# Patient Record
Sex: MALE | Employment: FULL TIME | URBAN - METROPOLITAN AREA
[De-identification: names, ages, dates, MRNs, and addresses within clinical notes are randomized per-mention and may not be internally consistent; named-entity substitution may affect disease eponyms.]

---

## 2024-09-12 ENCOUNTER — APPOINTMENT (OUTPATIENT)
Dept: PRIMARY CARE | Facility: CLINIC | Age: 29
End: 2024-09-12
Payer: COMMERCIAL

## 2024-09-26 ENCOUNTER — APPOINTMENT (OUTPATIENT)
Dept: PRIMARY CARE | Facility: CLINIC | Age: 29
End: 2024-09-26
Payer: COMMERCIAL

## 2024-09-26 VITALS
SYSTOLIC BLOOD PRESSURE: 128 MMHG | BODY MASS INDEX: 32.07 KG/M2 | OXYGEN SATURATION: 96 % | HEART RATE: 84 BPM | WEIGHT: 224 LBS | DIASTOLIC BLOOD PRESSURE: 72 MMHG | HEIGHT: 70 IN

## 2024-09-26 DIAGNOSIS — Z00.00 VISIT FOR PREVENTIVE HEALTH EXAMINATION: Primary | ICD-10-CM

## 2024-09-26 LAB
NON-UH HIE A/G RATIO: 1.1
NON-UH HIE ALB: 4 G/DL (ref 3.4–5)
NON-UH HIE ALK PHOS: 67 UNIT/L (ref 45–117)
NON-UH HIE APPEARANCE, U: CLEAR
NON-UH HIE BILIRUBIN, TOTAL: 0.6 MG/DL (ref 0.3–1.2)
NON-UH HIE BILIRUBIN, U: NEGATIVE
NON-UH HIE BLOOD, U: NEGATIVE
NON-UH HIE BUN/CREAT RATIO: 14.4
NON-UH HIE BUN: 13 MG/DL (ref 9–23)
NON-UH HIE CALCIUM: 9.5 MG/DL (ref 8.7–10.4)
NON-UH HIE CALCULATED LDL CHOLESTEROL: 149 MG/DL (ref 60–130)
NON-UH HIE CALCULATED OSMOLALITY: 279 MOSM/KG (ref 275–295)
NON-UH HIE CHLORIDE: 107 MMOL/L (ref 98–107)
NON-UH HIE CHOLESTEROL: 230 MG/DL (ref 100–200)
NON-UH HIE CO2, VENOUS: 26 MMOL/L (ref 20–31)
NON-UH HIE COLOR, U: NORMAL
NON-UH HIE CREATININE: 0.9 MG/DL (ref 0.6–1.1)
NON-UH HIE FREE T4: 0.98 NG/DL (ref 0.89–1.76)
NON-UH HIE GFR AA: >60
NON-UH HIE GLOBULIN: 3.6 G/DL
NON-UH HIE GLOMERULAR FILTRATION RATE: >60 ML/MIN/1.73M?
NON-UH HIE GLUCOSE QUAL, U: NEGATIVE
NON-UH HIE GLUCOSE: 83 MG/DL (ref 74–106)
NON-UH HIE GOT: 35 UNIT/L (ref 15–37)
NON-UH HIE GPT: 69 UNIT/L (ref 10–49)
NON-UH HIE HCT: 45.2 % (ref 41–52)
NON-UH HIE HDL CHOLESTEROL: 56 MG/DL (ref 40–60)
NON-UH HIE HGB: 14.7 G/DL (ref 13.5–17.5)
NON-UH HIE INSTR WBC ND: 5.6
NON-UH HIE K: 4.4 MMOL/L (ref 3.5–5.1)
NON-UH HIE KETONES, U: NEGATIVE
NON-UH HIE LEUKOCYTE ESTERASE, U: NEGATIVE
NON-UH HIE MCH: 28 PG (ref 27–34)
NON-UH HIE MCHC: 32.5 G/DL (ref 32–37)
NON-UH HIE MCV: 86.3 FL (ref 80–100)
NON-UH HIE MPV: 8.1 FL (ref 7.4–10.4)
NON-UH HIE NA: 140 MMOL/L (ref 135–145)
NON-UH HIE NITRITE, U: NEGATIVE
NON-UH HIE PH, U: 7.5 (ref 4.5–8)
NON-UH HIE PLATELET: 275 X10 (ref 150–450)
NON-UH HIE PROTEIN, U: NEGATIVE
NON-UH HIE RBC: 5.24 X10 (ref 4.7–6.1)
NON-UH HIE RDW: 12.9 % (ref 11.5–14.5)
NON-UH HIE SPECIFIC GRAVITY, U: 1.02 (ref 1–1.03)
NON-UH HIE TOTAL CHOL/HDL CHOL RATIO: 4.1
NON-UH HIE TOTAL PROTEIN: 7.6 G/DL (ref 5.7–8.2)
NON-UH HIE TRIGLYCERIDES: 126 MG/DL (ref 30–150)
NON-UH HIE TSH: 4.82 UIU/ML (ref 0.55–4.78)
NON-UH HIE U MICRO: NORMAL
NON-UH HIE UROBILINOGEN QUAL, U: NORMAL
NON-UH HIE VIT D 25: 14 NG/ML
NON-UH HIE VITAMIN B12: 480 PG/ML (ref 211–911)
NON-UH HIE WBC: 5.6 X10 (ref 4.5–11)

## 2024-09-26 PROCEDURE — 99395 PREV VISIT EST AGE 18-39: CPT | Performed by: FAMILY MEDICINE

## 2024-09-26 PROCEDURE — 3008F BODY MASS INDEX DOCD: CPT | Performed by: FAMILY MEDICINE

## 2024-09-26 PROCEDURE — 90471 IMMUNIZATION ADMIN: CPT | Performed by: FAMILY MEDICINE

## 2024-09-26 PROCEDURE — 90673 RIV3 VACCINE NO PRESERV IM: CPT | Performed by: FAMILY MEDICINE

## 2024-09-26 PROCEDURE — 1036F TOBACCO NON-USER: CPT | Performed by: FAMILY MEDICINE

## 2024-09-26 NOTE — PROGRESS NOTES
"Subjective   Patient ID: Guerrero Aquino is a 29 y.o. male who presents for Annual Exam.    Assessment/Plan     Problem List Items Addressed This Visit    None  Discussed about diet exercise and weight loss  Discussed about age-related immunization  Discussed about fasting lab work  Follow-up every year for physical  Flu vaccine today  Come back early with any new signs and symptoms  Patient understood and agreed with plan.    HPI    29-year-old male here for physical    No new signs and symptoms    No smoking alcohol or drug use    Mother had DM    No routine exercise        No Known Allergies    No current outpatient medications on file.     No current facility-administered medications for this visit.       Objective   Visit Vitals  /72 (BP Location: Left arm, Patient Position: Sitting, BP Cuff Size: Adult)   Pulse 84   Ht 1.778 m (5' 10\")   Wt 102 kg (224 lb)   SpO2 96%   BMI 32.14 kg/m²   Smoking Status Never   BSA 2.24 m²     Physical Exam  Constitutional   General appearance: Alert and in no acute distress.   Head and Face   Head and face: Normal.     Palpation of the face and sinuses: Normal.    Eyes   Inspection of eyes: Sclera and conjunctiva were normal.    Pupil exam: Pupils were equal in size. Extraocular movements were intact.   Ears, Nose, Mouth, and Throat   Ears: Auricles: Normal.    Otoscopic examination: Tympanic membranes: Normal with no congestion and no discharge. Otic Canals: Normal without tenderness, congestion or discharge.    Hearing: Normal.     Nasal mucosa, septum, and turbinates: Normal without edema or erythema.    Lips, teeth, and gums: Normal.    Oropharynx: Normal with moist mucus membranes, no congestion. Tonsils: Normal no follicles.   Neck   Neck Exam: Appearance of the neck was normal. No neck masses observed.    Thyroid exam: Not enlarged and no palpable thyroid nodules.   Pulmonary   Respiratory assessment: No respiratory distress, normal respiratory rhythm and effort.  "   Auscultation of Lungs: Clear bilateral breath sounds.   Cardiovascular   Auscultation of heart: Apical pulse normal, heart rate and rhythm normal, normal S1 and S2, no murmurs and no pericardial rub.    Carotid arteries: Pulses normal with no bruits.    Exam for edema: No peripheral edema.   Chest   Chest: Normal A_P diameter, no pulsation, no intercostal withdrawing. Trachea central.   Abdomen   Abdominal Exam: No bruits, normal bowel sounds, soft, non-tender, no abdominal mass palpated.    Liver and Spleen exam: No hepato-splenomegaly.    Examination for hernias: Normal.    Lymphatic   Palpation of lymph nodes in neck: No cervical lymphadenopathy.   Musculoskeletal   Examination of gait: Normal.    Inspection of digits and nails: No clubbing or cyanosis of the fingernails.    Inspection/palpation of joints, bones and muscles: No joint swelling. Normal movement of all extremities.    Range of Motion: Normal movement of all extremities.   Skin   Skin inspection: Normal skin color and pigmentation, normal skin turgor and no visible rash.   Neurologic   Cranial nerves: Nerves 2-12 were intact, no focal neuro defects.    Reflexes: Normal.     Sensation: Normal.     Coordination: Normal.    Psychiatric   Judgment and insight: Intact.    Orientation: Oriented to person, place, and time.    Recent and remote memory: Normal.     Mood and affect: Normal.     Genitourinary Declined.    Immunization History   Administered Date(s) Administered    Flu vaccine (IIV4), preservative free *Check age/dose* 11/15/2021, 12/15/2022, 10/09/2023    Influenza, seasonal, injectable 11/09/2020    Moderna SARS-CoV-2 Vaccination 04/05/2021, 05/03/2021, 11/29/2021    Novel Influenza-H1N1-09, nasal 12/17/2009    Tdap vaccine, age 7 year and older (BOOSTRIX, ADACEL) 06/29/2016       Review of Systems    No visits with results within 4 Month(s) from this visit.   Latest known visit with results is:   Legacy Encounter on 02/04/2019   Component  Date Value Ref Range Status    WBC 02/04/2019 8.7  4.4 - 11.3 x10E9/L Final    nRBC 02/04/2019 0.0  0.0 - 0.0 /100 WBC Final    RBC 02/04/2019 5.58  4.50 - 5.90 x10E12/L Final    Hemoglobin 02/04/2019 15.6  13.5 - 17.5 g/dL Final    Hematocrit 02/04/2019 50.3  41.0 - 52.0 % Final    MCV 02/04/2019 90  80 - 100 fL Final    MCHC 02/04/2019 31.0 (L)  32.0 - 36.0 g/dL Final    Platelets 02/04/2019 257  150 - 450 x10E9/L Final    RDW 02/04/2019 12.1  11.5 - 14.5 % Final    Neutrophils % 02/04/2019 87.1  40.0 - 80.0 % Final    Immature Granulocytes %, Automated 02/04/2019 0.5  0.0 - 0.9 % Final    Lymphocytes % 02/04/2019 7.4  13.0 - 44.0 % Final    Monocytes % 02/04/2019 4.0  2.0 - 10.0 % Final    Eosinophils % 02/04/2019 0.7  0.0 - 6.0 % Final    Basophils % 02/04/2019 0.3  0.0 - 2.0 % Final    Neutrophils Absolute 02/04/2019 7.57  1.20 - 7.70 x10E9/L Final    Lymphocytes Absolute 02/04/2019 0.64 (L)  1.20 - 4.80 x10E9/L Final    Monocytes Absolute 02/04/2019 0.35  0.10 - 1.00 x10E9/L Final    Eosinophils Absolute 02/04/2019 0.06  0.00 - 0.70 x10E9/L Final    Basophils Absolute 02/04/2019 0.03  0.00 - 0.10 x10E9/L Final    Flu A Result 02/04/2019 DETECTED (A)  Not Detected Final    Flu B Result 02/04/2019 NOT DETECTED  Not Detected Final       Radiology: Reviewed imaging in powerchart.  No results found.    No family history on file.  Social History     Socioeconomic History    Marital status: Single   Tobacco Use    Smoking status: Never    Smokeless tobacco: Never   Substance and Sexual Activity    Alcohol use: Never    Drug use: Never     Past Medical History:   Diagnosis Date    Acute upper respiratory infection, unspecified 02/04/2019    Acute URI    Influenza due to other identified influenza virus with other respiratory manifestations     Influenza A    Other injury of unspecified body region, initial encounter 06/29/2016    Superficial laceration of skin    Other specified respiratory disorders 12/08/2015     Respiratory infection    Personal history of other diseases of the respiratory system 02/04/2019    History of acute pharyngitis    Personal history of other diseases of the respiratory system 02/04/2019    History of acute sinusitis    Unspecified injury of left ankle, initial encounter 06/29/2016    Injury of ankle, left     No past surgical history on file.    Charting was completed using voice recognition technology and may include unintended errors.

## 2024-09-27 ENCOUNTER — TELEPHONE (OUTPATIENT)
Dept: PRIMARY CARE | Facility: CLINIC | Age: 29
End: 2024-09-27
Payer: COMMERCIAL

## 2024-09-27 DIAGNOSIS — E55.9 VITAMIN D DEFICIENCY: ICD-10-CM

## 2024-09-27 RX ORDER — ERGOCALCIFEROL 1.25 MG/1
50000 CAPSULE ORAL WEEKLY
Qty: 12 CAPSULE | Refills: 0 | Status: SHIPPED | OUTPATIENT
Start: 2024-09-27 | End: 2024-12-20

## 2024-09-27 NOTE — TELEPHONE ENCOUNTER
----- Message from Anibal Mejia sent at 9/27/2024 10:29 AM EDT -----  Please call the patient regarding his abnormal result.  Vit d def  send vit d 50k q wek for 12 wks  Lipid and liver function abnormal  needs low carb diet , exercise and weight loss  Follow up in  months

## 2024-11-01 ENCOUNTER — OFFICE VISIT (OUTPATIENT)
Dept: PRIMARY CARE | Facility: CLINIC | Age: 29
End: 2024-11-01
Payer: COMMERCIAL

## 2024-11-01 VITALS
TEMPERATURE: 97.3 F | BODY MASS INDEX: 31.92 KG/M2 | HEIGHT: 70 IN | WEIGHT: 223 LBS | HEART RATE: 94 BPM | DIASTOLIC BLOOD PRESSURE: 82 MMHG | OXYGEN SATURATION: 97 % | SYSTOLIC BLOOD PRESSURE: 130 MMHG

## 2024-11-01 DIAGNOSIS — R05.1 ACUTE COUGH: Primary | ICD-10-CM

## 2024-11-01 PROCEDURE — 3008F BODY MASS INDEX DOCD: CPT | Performed by: FAMILY MEDICINE

## 2024-11-01 PROCEDURE — 99213 OFFICE O/P EST LOW 20 MIN: CPT | Performed by: FAMILY MEDICINE

## 2024-11-01 PROCEDURE — 1036F TOBACCO NON-USER: CPT | Performed by: FAMILY MEDICINE

## 2024-11-01 RX ORDER — BROMPHENIRAMINE MALEATE, PSEUDOEPHEDRINE HYDROCHLORIDE, AND DEXTROMETHORPHAN HYDROBROMIDE 2; 30; 10 MG/5ML; MG/5ML; MG/5ML
5 SYRUP ORAL 4 TIMES DAILY PRN
Qty: 120 ML | Refills: 0 | Status: SHIPPED | OUTPATIENT
Start: 2024-11-01 | End: 2024-11-11

## 2024-11-01 RX ORDER — BENZONATATE 100 MG/1
200 CAPSULE ORAL 3 TIMES DAILY PRN
Qty: 30 CAPSULE | Refills: 0 | Status: SHIPPED | OUTPATIENT
Start: 2024-11-01 | End: 2024-11-06

## 2024-11-01 RX ORDER — AZITHROMYCIN 250 MG/1
TABLET, FILM COATED ORAL
Qty: 6 TABLET | Refills: 0 | Status: SHIPPED | OUTPATIENT
Start: 2024-11-01 | End: 2024-11-05